# Patient Record
Sex: MALE | Race: WHITE | URBAN - METROPOLITAN AREA
[De-identification: names, ages, dates, MRNs, and addresses within clinical notes are randomized per-mention and may not be internally consistent; named-entity substitution may affect disease eponyms.]

---

## 2022-08-15 ENCOUNTER — HOSPITAL ENCOUNTER (EMERGENCY)
Age: 62
Discharge: HOME | End: 2022-08-15
Attending: EMERGENCY MEDICINE
Payer: COMMERCIAL

## 2022-08-15 VITALS
WEIGHT: 189 LBS | BODY MASS INDEX: 25.6 KG/M2 | SYSTOLIC BLOOD PRESSURE: 147 MMHG | HEART RATE: 67 BPM | OXYGEN SATURATION: 99 % | RESPIRATION RATE: 14 BRPM | DIASTOLIC BLOOD PRESSURE: 99 MMHG | HEIGHT: 72 IN | TEMPERATURE: 97.2 F

## 2022-08-15 DIAGNOSIS — L23.7 ALLERGIC CONTACT DERMATITIS DUE TO PLANTS, EXCEPT FOOD: Primary | ICD-10-CM

## 2022-08-15 PROCEDURE — 99283 EMERGENCY DEPT VISIT LOW MDM: CPT

## 2022-08-15 RX ORDER — PREDNISONE 20 MG/1
40 TABLET ORAL DAILY
Qty: 10 TABLET | Refills: 0 | Status: SHIPPED | OUTPATIENT
Start: 2022-08-15 | End: 2022-08-20

## 2022-08-15 RX ORDER — LISINOPRIL 10 MG/1
10 TABLET ORAL DAILY
COMMUNITY

## 2022-08-15 RX ORDER — CETIRIZINE HYDROCHLORIDE 10 MG/1
10 TABLET ORAL DAILY
Qty: 30 TABLET | Refills: 0 | Status: SHIPPED | OUTPATIENT
Start: 2022-08-15 | End: 2022-09-14

## 2022-08-15 RX ORDER — FAMOTIDINE 20 MG/1
20 TABLET, FILM COATED ORAL 2 TIMES DAILY
Qty: 14 TABLET | Refills: 0 | Status: SHIPPED | OUTPATIENT
Start: 2022-08-15 | End: 2022-08-22

## 2022-08-15 ASSESSMENT — PAIN - FUNCTIONAL ASSESSMENT
PAIN_FUNCTIONAL_ASSESSMENT: NONE - DENIES PAIN
PAIN_FUNCTIONAL_ASSESSMENT: NONE - DENIES PAIN

## 2022-08-15 NOTE — DISCHARGE INSTRUCTIONS
Please follow-up with primary care physician or referral physician next 24 to 48 hours. Call to schedule appointment.     Return to the emergency department for increasing pain, redness, swelling, visual changes, any other concerning symptoms

## 2022-08-15 NOTE — ED PROVIDER NOTES
Emergency Department Encounter  [address]    Patient: Roosevelt Severin  MRN: [id number]  : 1960  Date of Evaluation: 8/15/2022  ED Provider: Brandee Mariscal MD    Chief Complaint       Chief Complaint   Patient presents with    Rash     Believes that he got into some poison ivy over the weekend     [address] Rajat Vargas is a 58 y.o. male who presents to the emergency department for evaluation of pruritic rash. Patient reports being in usual state of health until 24 hours ago when symptoms first started. He reports that he was doing yard work over the weekend he was very hot and sweaty to have been wiping his face. He developed a pruritic rash over the course the last 24 hours does feel similar to previous episodes with poison ivy. He has been using topical salves as well as soaps and lotions to help out with the symptoms. He says that the pruritus is centered on the right side of his face posterior aspect of his neck and his right leg. Denies any difficulty swallowing or difficulty breathing no intraoral lesions no fevers or trauma and no changes in vision. ROS:     At least 10 systems reviewed and otherwise acutely negative except as in the [address] Past History     Past Medical History:   Diagnosis Date    Hypertension      History reviewed. No pertinent surgical history. Social History     Socioeconomic History    Marital status:      Spouse name: None    Number of children: None    Years of education: None    Highest education level: None   Tobacco Use    Smoking status: Never    Smokeless tobacco: Never   Substance and Sexual Activity    Alcohol use: Never    Drug use: Never    Sexual activity: Yes     Partners: Female       Medications/Allergies     Previous Medications    LISINOPRIL (PRINIVIL;ZESTRIL) 10 MG TABLET    Take 10 mg by mouth in the morning.      No Known Allergies     Physical Exam       ED Triage Vitals [08/15/22 0736]   BP Temp Temp Source Heart Rate Resp SpO2 Height Weight   (!) 147/99 97.2 °F (36.2 °C) Infrared 67 14 99 % 6' (1.829 m) 189 lb (85.7 kg)     GENERAL APPEARANCE: Awake and alert. Cooperative. No acute distress. HEAD: Normocephalic. Atraumatic. EYES: Sclera anicteric. Pupils equal round reactive to light extraocular movements are intact  ENT: Tolerates saliva. No trismus. Moist mucous membranes  NECK: Supple. Trachea midline. No meningismus  CARDIO: RRR. LUNGS: Respirations unlabored. CTAB. ABDOMEN: Soft. Non-distended. Non-tender. EXTREMITIES: No acute deformities. SKIN: Warm and dry. Erythematous maculopapular rash on right side of his face. There is noted in the posterior aspect of his neck as well. No petechiae or bullae  NEUROLOGICAL:  Cranial nerves II through XII grossly intact. PSYCHIATRIC: Normal mood. Alert and oriented x3. Diagnostics   Labs:  No results found for this visit on 08/15/22. Radiographs:  No results found. Procedures/EKG:       ED Course and MDM   In brief, Saúl Georges is a 58 y.o. male who presented to the emergency department for evaluation of an erythematous paretic rash. Based on patient's history physical does seem most concerning for possible contact dermatitis due to plants. There is no edema or induration of the skin in the periorbital region. Low clinical suspicion for cellulitis. Patient denies fevers or trauma. No visual changes. Patient is also complaining of maculopapular rash in other parts of his body as well. Based on patient's history and physical I do not believe the patient needs any imaging studies or laboratory work at this time.   We will treat the patient with short course of steroids Vistaril recommend close follow-up with primary care physician referral physician next 24 to 48 hours return to the emergency department for increased pain, redness, swelling, any other concerning symptoms he did express a verbal understanding of these instructions    ED Medication Orders (From admission, onward)      None            Final Impression      1.  Allergic contact dermatitis due to plants, except food      DISPOSITION Decision To Discharge 08/15/2022 07:58:03 AM         (Please note that portions of this note may have been completed with a voice recognition program. Efforts were made to edit the dictations but occasionally words are mis-transcribed.)    Rivka Christianson MD  [facility]       Rivka Christianson MD  08/15/22 9013

## 2022-08-15 NOTE — Clinical Note
Ruth Benedict was seen and treated in our emergency department on 8/15/2022. He may return to work on 08/17/2022. If you have any questions or concerns, please don't hesitate to call.       Shlomo Lovell MD

## 2022-08-15 NOTE — ED NOTES
The patient presents to the er today with complaints of a rash that is mainly on his face and neck. He believes that he got into some poison ivy over the weekend. The call light is within reach.       Tamia Cosby RN  08/15/22 07

## 2025-06-02 ENCOUNTER — HOSPITAL ENCOUNTER (OUTPATIENT)
Dept: CT IMAGING | Age: 65
Discharge: HOME | End: 2025-06-02
Payer: COMMERCIAL

## 2025-06-02 DIAGNOSIS — Z82.49 FAMILY HISTORY OF ISCHEMIC HEART DISEASE: ICD-10-CM

## 2025-06-02 DIAGNOSIS — E78.2 MIXED HYPERLIPIDEMIA: ICD-10-CM

## 2025-06-02 PROCEDURE — 75571 CT HRT W/O DYE W/CA TEST: CPT
